# Patient Record
Sex: FEMALE | Race: WHITE | NOT HISPANIC OR LATINO | Employment: FULL TIME | URBAN - METROPOLITAN AREA
[De-identification: names, ages, dates, MRNs, and addresses within clinical notes are randomized per-mention and may not be internally consistent; named-entity substitution may affect disease eponyms.]

---

## 2023-03-28 DIAGNOSIS — K21.9 GASTROESOPHAGEAL REFLUX DISEASE WITHOUT ESOPHAGITIS: ICD-10-CM

## 2023-03-28 RX ORDER — OMEPRAZOLE 40 MG/1
CAPSULE, DELAYED RELEASE ORAL
Qty: 90 CAPSULE | Refills: 1 | Status: SHIPPED | OUTPATIENT
Start: 2023-03-28 | End: 2023-11-28 | Stop reason: SDUPTHER

## 2023-06-27 ENCOUNTER — TELEPHONE (OUTPATIENT)
Dept: PRIMARY CARE | Facility: CLINIC | Age: 63
End: 2023-06-27

## 2023-06-27 ENCOUNTER — OFFICE VISIT (OUTPATIENT)
Dept: PRIMARY CARE | Facility: CLINIC | Age: 63
End: 2023-06-27
Payer: COMMERCIAL

## 2023-06-27 VITALS
TEMPERATURE: 97.1 F | SYSTOLIC BLOOD PRESSURE: 122 MMHG | HEIGHT: 68 IN | BODY MASS INDEX: 28.64 KG/M2 | WEIGHT: 189 LBS | HEART RATE: 74 BPM | DIASTOLIC BLOOD PRESSURE: 79 MMHG

## 2023-06-27 DIAGNOSIS — K11.21 PAROTITIS, ACUTE: Primary | ICD-10-CM

## 2023-06-27 PROBLEM — K21.9 GERD (GASTROESOPHAGEAL REFLUX DISEASE): Status: ACTIVE | Noted: 2023-06-27

## 2023-06-27 PROBLEM — D72.819 LEUKOPENIA: Status: ACTIVE | Noted: 2023-06-27

## 2023-06-27 PROBLEM — R76.8 RHEUMATOID FACTOR POSITIVE: Status: ACTIVE | Noted: 2023-06-27

## 2023-06-27 PROBLEM — M85.80 OSTEOPENIA: Status: ACTIVE | Noted: 2023-06-27

## 2023-06-27 PROBLEM — E78.5 HLD (HYPERLIPIDEMIA): Status: ACTIVE | Noted: 2023-06-27

## 2023-06-27 PROBLEM — R51.9 HEADACHE: Status: ACTIVE | Noted: 2023-06-27

## 2023-06-27 PROCEDURE — 99213 OFFICE O/P EST LOW 20 MIN: CPT | Performed by: FAMILY MEDICINE

## 2023-06-27 PROCEDURE — 1036F TOBACCO NON-USER: CPT | Performed by: FAMILY MEDICINE

## 2023-06-27 RX ORDER — AMOXICILLIN AND CLAVULANATE POTASSIUM 875; 125 MG/1; MG/1
875 TABLET, FILM COATED ORAL 2 TIMES DAILY
Qty: 20 TABLET | Refills: 0 | Status: SHIPPED | OUTPATIENT
Start: 2023-06-27 | End: 2023-07-07

## 2023-06-27 RX ORDER — IBUPROFEN 600 MG/1
TABLET ORAL
COMMUNITY
Start: 2013-06-23

## 2023-06-27 RX ORDER — VIT C/E/ZN/COPPR/LUTEIN/ZEAXAN 250MG-90MG
25 CAPSULE ORAL DAILY
COMMUNITY

## 2023-06-27 ASSESSMENT — PATIENT HEALTH QUESTIONNAIRE - PHQ9
1. LITTLE INTEREST OR PLEASURE IN DOING THINGS: NOT AT ALL
SUM OF ALL RESPONSES TO PHQ9 QUESTIONS 1 AND 2: 0
2. FEELING DOWN, DEPRESSED OR HOPELESS: NOT AT ALL

## 2023-06-27 NOTE — PROGRESS NOTES
"Subjective   Patient ID: Maryam Mccarthy is a 63 y.o. female who presents for Facial Swelling (Right sided face swelling since this morning.  Warm to the touch.  Teeth feel fine, throat doesn't hurt, and her ears are fine. ).    HPI   64 yo female presents co right sided facial swelling since this morning  Feels warm  No fevers or chills  No dental pain  No congestion, sore throat, ear pain  No change in salivation  No regular meds/antihistamines  No hx of similar issue in past  No hx of parotitis         Review of Systems  ROS as stated in HPI    Objective   /79   Pulse 74   Temp 36.2 °C (97.1 °F)   Ht 1.727 m (5' 8\")   Wt 85.7 kg (189 lb)   BMI 28.74 kg/m²     Physical Exam  Constitutional: A&O; NAD  HEENT: conjunctiva normal. EOMI; no sores in mouth. No posterior pharyngeal erythema or edema; +right parotid swelling and tenderness with mild erythema and warmth; no rash  PERRLA; lids normal; TM's clear;   Neck: supple;   Heart: RRR     Lungs: CTA bilateral    Neuro: No gross neuro deficits    Psych: Judgment, orientation, mood, affect, insight wnl   Assessment/Plan   Problem List Items Addressed This Visit    None  Visit Diagnoses       Parotitis, acute    -  Primary    Relevant Medications    amoxicillin-pot clavulanate (Augmentin) 875-125 mg tablet          Warm compresses; suck on sour candy  Rx augmentin  Fu if persists or worsens.  Pt info handout on parotitis from up to date site given to pt         "

## 2023-10-23 ENCOUNTER — TELEPHONE (OUTPATIENT)
Dept: PRIMARY CARE | Facility: CLINIC | Age: 63
End: 2023-10-23
Payer: COMMERCIAL

## 2023-10-23 NOTE — TELEPHONE ENCOUNTER
Patient has an appointment scheduled for December 7th and she now learned she has to go to a meeting for work on that day.   She wants to get her physical in before the new year.  Is there a time that you can squeeze her in.

## 2023-11-28 ENCOUNTER — HOSPITAL ENCOUNTER (OUTPATIENT)
Dept: RADIOLOGY | Facility: EXTERNAL LOCATION | Age: 63
Discharge: HOME | End: 2023-11-28

## 2023-11-28 ENCOUNTER — OFFICE VISIT (OUTPATIENT)
Dept: PRIMARY CARE | Facility: CLINIC | Age: 63
End: 2023-11-28
Payer: COMMERCIAL

## 2023-11-28 VITALS
TEMPERATURE: 97.9 F | HEIGHT: 68 IN | DIASTOLIC BLOOD PRESSURE: 75 MMHG | WEIGHT: 175.2 LBS | HEART RATE: 80 BPM | SYSTOLIC BLOOD PRESSURE: 122 MMHG | BODY MASS INDEX: 26.55 KG/M2

## 2023-11-28 DIAGNOSIS — Z23 ENCOUNTER FOR IMMUNIZATION: ICD-10-CM

## 2023-11-28 DIAGNOSIS — K21.9 GASTROESOPHAGEAL REFLUX DISEASE WITHOUT ESOPHAGITIS: ICD-10-CM

## 2023-11-28 DIAGNOSIS — Z13.820 OSTEOPOROSIS SCREENING: ICD-10-CM

## 2023-11-28 DIAGNOSIS — Z78.0 POSTMENOPAUSAL: ICD-10-CM

## 2023-11-28 DIAGNOSIS — Z00.00 HEALTHCARE MAINTENANCE: Primary | ICD-10-CM

## 2023-11-28 LAB
NON-UH HIE A/G RATIO: 1.3
NON-UH HIE ALB: 4.1 G/DL (ref 3.4–5)
NON-UH HIE ALK PHOS: 68 UNIT/L (ref 45–117)
NON-UH HIE BILIRUBIN, TOTAL: 1.2 MG/DL (ref 0.3–1.2)
NON-UH HIE BUN/CREAT RATIO: 12.2
NON-UH HIE BUN: 11 MG/DL (ref 9–23)
NON-UH HIE CALCIUM: 9.8 MG/DL (ref 8.7–10.4)
NON-UH HIE CALCULATED LDL CHOLESTEROL: 143 MG/DL (ref 60–130)
NON-UH HIE CALCULATED OSMOLALITY: 282 MOSM/KG (ref 275–295)
NON-UH HIE CHLORIDE: 107 MMOL/L (ref 98–107)
NON-UH HIE CHOLESTEROL: 227 MG/DL (ref 100–200)
NON-UH HIE CO2, VENOUS: 28 MMOL/L (ref 20–31)
NON-UH HIE CREATININE: 0.9 MG/DL (ref 0.5–0.8)
NON-UH HIE FREE T4: 1.03 NG/DL (ref 0.89–1.76)
NON-UH HIE GFR AA: >60
NON-UH HIE GLOBULIN: 3.1 G/DL
NON-UH HIE GLOMERULAR FILTRATION RATE: >60 ML/MIN/1.73M?
NON-UH HIE GLUCOSE: 90 MG/DL (ref 74–106)
NON-UH HIE GOT: 22 UNIT/L (ref 15–37)
NON-UH HIE GPT: 10 UNIT/L (ref 10–49)
NON-UH HIE HCT: 38.4 % (ref 36–46)
NON-UH HIE HDL CHOLESTEROL: 56 MG/DL (ref 40–60)
NON-UH HIE HGB: 13.3 G/DL (ref 12–16)
NON-UH HIE INSTR WBC ND: 3.5
NON-UH HIE K: 4.6 MMOL/L (ref 3.5–5.1)
NON-UH HIE MCH: 30.6 PG (ref 27–34)
NON-UH HIE MCHC: 34.6 G/DL (ref 32–37)
NON-UH HIE MCV: 88.3 FL (ref 80–100)
NON-UH HIE MPV: 6.8 FL (ref 7.4–10.4)
NON-UH HIE NA: 142 MMOL/L (ref 135–145)
NON-UH HIE PLATELET: 249 X10 (ref 150–450)
NON-UH HIE RBC: 4.35 X10 (ref 4.2–5.4)
NON-UH HIE RDW: 12.8 % (ref 11.5–14.5)
NON-UH HIE TOTAL CHOL/HDL CHOL RATIO: 4.1
NON-UH HIE TOTAL PROTEIN: 7.2 G/DL (ref 5.7–8.2)
NON-UH HIE TRIGLYCERIDES: 140 MG/DL (ref 30–150)
NON-UH HIE TSH: 5.81 UIU/ML (ref 0.55–4.78)
NON-UH HIE VIT D 25: 34 NG/ML
NON-UH HIE WBC: 3.5 X10 (ref 4.5–11)

## 2023-11-28 PROCEDURE — 1036F TOBACCO NON-USER: CPT | Performed by: FAMILY MEDICINE

## 2023-11-28 PROCEDURE — 90686 IIV4 VACC NO PRSV 0.5 ML IM: CPT | Performed by: FAMILY MEDICINE

## 2023-11-28 PROCEDURE — 90471 IMMUNIZATION ADMIN: CPT | Performed by: FAMILY MEDICINE

## 2023-11-28 PROCEDURE — 99396 PREV VISIT EST AGE 40-64: CPT | Performed by: FAMILY MEDICINE

## 2023-11-28 RX ORDER — OMEPRAZOLE 40 MG/1
CAPSULE, DELAYED RELEASE ORAL
Qty: 90 CAPSULE | Refills: 1 | Status: SHIPPED | OUTPATIENT
Start: 2023-11-28 | End: 2024-05-28

## 2023-11-28 ASSESSMENT — PATIENT HEALTH QUESTIONNAIRE - PHQ9
1. LITTLE INTEREST OR PLEASURE IN DOING THINGS: NOT AT ALL
2. FEELING DOWN, DEPRESSED OR HOPELESS: NOT AT ALL
SUM OF ALL RESPONSES TO PHQ9 QUESTIONS 1 AND 2: 0

## 2023-11-28 NOTE — PROGRESS NOTES
"Subjective   Patient ID: Maryam Mccarthy is a 63 y.o. female who presents for Annual Exam (Blood work done this morning.  Mammo 8/23 at [facility].  Flu shot today. ).    HPI     63 -year-old female presents for physical     sees gyn for exams/mammos;   s/p complete hysterectomy/BSO; hx of uterine CA 2010; no chemo or radiation.       BMI 26 wt is down 15 lbs since last yr   has been watching diet and walking      12/2020 DEXA: normal prior showed osteopenia  takes Ca+vit D     colonoscopy 8/2017 [facility]. normal. f/u 5 yr   had CT 2021- showed colitis; saw GI for f/u;     tetanus 2017   Zostavax 2015; shingrix- had  Flu shot- will get today  covid shots- had;       sees optho; hx of left eye vitreos detachment   She has seen her dentist for regular cleanings      She denies any chest pains, palpitations, edema, shortness of breath, abdominal pains, nausea, vomiting, diarrhea, constipation, blood in stool, melena.   hx reflux- controlled on omeprazole;      saw derm once 2019 ; plans to see again for a skin check;       hx of leukopenia- saw heme dr sierra 2018 - neg workup for WBC. was found to have +IVETH and RF; referred to rheum- saw dr almazan 2/2019- asymptompatic.      +stress at work- works for [employer];          Review of Systems  ROS as stated in HPI    Objective   /75   Pulse 80   Temp 36.6 °C (97.9 °F)   Ht 1.727 m (5' 8\")   Wt 79.5 kg (175 lb 3.2 oz)   BMI 26.64 kg/m²     Physical Exam  Constitutional: A&O; NAD  HEENT: conjunctiva normal. EOMI; PERRLA; lids normal; TM's clear;   Neck: supple; No lymphadenopathy   Heart: RRR     Lungs: CTA bilateral   Abd: Soft, Nontender, Nondistended  Ext:  No edema;    Neuro: No gross neuro deficits     Psych: Judgment, orientation, mood, affect, insight wnl   Assessment/Plan   Problem List Items Addressed This Visit             ICD-10-CM    GERD (gastroesophageal reflux disease) K21.9    Relevant Medications    omeprazole (PriLOSEC) 40 mg DR capsule     Other " Visit Diagnoses         Codes    Healthcare maintenance    -  Primary Z00.00    Relevant Orders    Comprehensive Metabolic Panel    Lipid Panel    CBC    TSH with reflex to Free T4 if abnormal    Vitamin D 25-Hydroxy,Total (for eval of Vitamin D levels)    Osteoporosis screening     Z13.820    Relevant Orders    XR DEXA bone density (Completed)    Postmenopausal     Z78.0    Relevant Orders    XR DEXA bone density (Completed)    Encounter for immunization     Z23    Relevant Orders    Flu vaccine (IIV4) age 6 months and greater, preservative free (Completed)          sees gyn for exams/mammos.   8/2017 colonoscopy- f/u 5 yrs; is due- f/u with GI # given  Tdap 2017  flu shot GIVEN today  had shingrix   Defers RSV  12/2020 bone density test; recheck DEXA  Recommend Calcium + Vitamin D supplement.   healthy lifestyle-diet, exercise.   check labs   Fu with derm for skin check #s given  Wellness form signed

## 2023-11-30 ENCOUNTER — TELEPHONE (OUTPATIENT)
Dept: PRIMARY CARE | Facility: CLINIC | Age: 63
End: 2023-11-30
Payer: COMMERCIAL

## 2023-11-30 DIAGNOSIS — R79.89 ELEVATED SERUM CREATININE: ICD-10-CM

## 2023-11-30 DIAGNOSIS — R79.89 ELEVATED TSH: ICD-10-CM

## 2023-11-30 NOTE — TELEPHONE ENCOUNTER
----- Message from Angelina Vitale DO sent at 11/29/2023 11:21 AM EST -----  Please let pt know that vitamin D, sugar, electrolytes, and liver function are all normal. Her cholesterol was slightly elevated.    I recommend a healthy diet and exercise and we will cont to monitor.   Her white blood cell count was again slightly low but stable.   Her kidney function was slightly elevated at 0.9 (should be less than 0.8) and  her screening thyroid test was slightly out of range but the actual level of circulating thyroid hormone was normal.  We can recheck her kidney and thyroid in 6 wks.  Enter order for nonfasting labs  Bmp dx elevated creatinine  Tsh and t4free dx elevated TSH    ----- Message -----  From: Loulou Casanova  Sent: 11/29/2023  10:54 AM EST  To: Angelina Vitale DO

## 2024-01-26 LAB
NON-UH HIE BUN/CREAT RATIO: 17.8
NON-UH HIE BUN: 16 MG/DL (ref 9–23)
NON-UH HIE CALCIUM: 9.6 MG/DL (ref 8.7–10.4)
NON-UH HIE CALCULATED OSMOLALITY: 275 MOSM/KG (ref 275–295)
NON-UH HIE CHLORIDE: 102 MMOL/L (ref 98–107)
NON-UH HIE CO2, VENOUS: 29 MMOL/L (ref 20–31)
NON-UH HIE CREATININE: 0.9 MG/DL (ref 0.5–0.8)
NON-UH HIE FREE T4: 1.04 NG/DL (ref 0.89–1.76)
NON-UH HIE GFR AA: >60
NON-UH HIE GLOMERULAR FILTRATION RATE: >60 ML/MIN/1.73M?
NON-UH HIE GLUCOSE: 88 MG/DL (ref 74–106)
NON-UH HIE K: 3.9 MMOL/L (ref 3.5–5.1)
NON-UH HIE NA: 137 MMOL/L (ref 135–145)
NON-UH HIE TSH: 3.28 UIU/ML (ref 0.55–4.78)

## 2024-01-31 ENCOUNTER — TELEPHONE (OUTPATIENT)
Dept: PRIMARY CARE | Facility: CLINIC | Age: 64
End: 2024-01-31
Payer: COMMERCIAL

## 2024-01-31 NOTE — TELEPHONE ENCOUNTER
----- Message from Angelina Vitale, DO sent at 1/31/2024 10:31 AM EST -----  Let patient know her repeat thyroid levels are normal.  Her repeat kidney function was again just slightly elevated at 0.9 (should be less than 0.8).  We will continue to monitor.

## 2024-05-02 ENCOUNTER — TELEPHONE (OUTPATIENT)
Dept: PRIMARY CARE | Facility: CLINIC | Age: 64
End: 2024-05-02
Payer: COMMERCIAL

## 2024-05-02 NOTE — TELEPHONE ENCOUNTER
Pt is asking for the colonoscopy and bone density scan to be put in the system. Maryam #[phone]

## 2024-05-09 DIAGNOSIS — Z12.11 COLON CANCER SCREENING: ICD-10-CM

## 2024-05-09 DIAGNOSIS — Z78.0 POSTMENOPAUSAL: ICD-10-CM

## 2024-05-09 DIAGNOSIS — Z13.820 OSTEOPOROSIS SCREENING: ICD-10-CM

## 2024-05-13 ENCOUNTER — HOSPITAL ENCOUNTER (OUTPATIENT)
Dept: RADIOLOGY | Facility: CLINIC | Age: 64
Discharge: HOME | End: 2024-05-13
Payer: COMMERCIAL

## 2024-05-13 ENCOUNTER — OFFICE VISIT (OUTPATIENT)
Dept: URGENT CARE | Facility: CLINIC | Age: 64
End: 2024-05-13
Payer: COMMERCIAL

## 2024-05-13 VITALS
DIASTOLIC BLOOD PRESSURE: 64 MMHG | HEART RATE: 80 BPM | TEMPERATURE: 97.5 F | WEIGHT: 168 LBS | HEIGHT: 69 IN | SYSTOLIC BLOOD PRESSURE: 107 MMHG | OXYGEN SATURATION: 96 % | BODY MASS INDEX: 24.88 KG/M2 | RESPIRATION RATE: 16 BRPM

## 2024-05-13 DIAGNOSIS — S99.912A INJURY OF LEFT ANKLE, INITIAL ENCOUNTER: ICD-10-CM

## 2024-05-13 DIAGNOSIS — S99.912A INJURY OF LEFT ANKLE, INITIAL ENCOUNTER: Primary | ICD-10-CM

## 2024-05-13 DIAGNOSIS — S93.492A SPRAIN OF ANTERIOR TALOFIBULAR LIGAMENT OF LEFT ANKLE, INITIAL ENCOUNTER: ICD-10-CM

## 2024-05-13 PROCEDURE — 73630 X-RAY EXAM OF FOOT: CPT | Mod: LEFT SIDE | Performed by: RADIOLOGY

## 2024-05-13 PROCEDURE — 73564 X-RAY EXAM KNEE 4 OR MORE: CPT | Mod: LT

## 2024-05-13 PROCEDURE — A6449 LT COMPRES BAND >=3" <5"/YD: HCPCS | Performed by: PHYSICIAN ASSISTANT

## 2024-05-13 PROCEDURE — 99203 OFFICE O/P NEW LOW 30 MIN: CPT | Performed by: PHYSICIAN ASSISTANT

## 2024-05-13 PROCEDURE — 73630 X-RAY EXAM OF FOOT: CPT | Mod: LT

## 2024-05-13 PROCEDURE — 73610 X-RAY EXAM OF ANKLE: CPT | Mod: LT

## 2024-05-13 PROCEDURE — 73564 X-RAY EXAM KNEE 4 OR MORE: CPT | Mod: LEFT SIDE | Performed by: RADIOLOGY

## 2024-05-13 PROCEDURE — 73610 X-RAY EXAM OF ANKLE: CPT | Mod: LEFT SIDE | Performed by: RADIOLOGY

## 2024-05-13 ASSESSMENT — PAIN SCALES - GENERAL: PAINLEVEL: 8

## 2024-05-13 NOTE — PATIENT INSTRUCTIONS
Assessment/Plan   Problem List Items Addressed This Visit       Injury of left ankle - Primary    Relevant Orders    XR foot left 3+ views    XR ankle left 3+ views    Sprain of anterior talofibular ligament of left ankle         Rest, ice the area. Recommend icing area 4 times daily. Do not ice for more than 20 minutes at a time. Wait at least 30min between icings.     Compress the affected area using Ace wrap and elevate to help decrease swelling.     Take ibuprofen as needed for pain and inflammation. If you cannot tolerate NSAIDS due to health conditions, pregnancy, or allergy, use tylenol instead of ibuprofen.     Follow-up with an PCP or Ortho if symptoms persist despite rest and treatment.

## 2024-05-13 NOTE — PROGRESS NOTES
"Subjective   Patient ID: Maryam Mccarthy is a 64 y.o. female who presents for Ankle Injury (Fell off deck @ 2:30 ).  Patient sustained injury to her left foot ankle and knee after suffering a fall off of her deck while trying to step off and it this afternoon.  She notes that she inverted the left foot and fell onto the left knee.  She reports a history of osteopenia and previous fracture to the left ankle.  She denies any numbness or tingling.  No further injuries from the fall.  No head trauma or loss of consciousness no use of blood thinners.    Past Medical History:   Diagnosis Date   • Asymptomatic menopausal state     Postmenopausal   • Encounter for screening for osteoporosis     Screening for osteoporosis   • Fracture of coccyx, initial encounter for closed fracture (Multi)     Fracture of coccyx   • Lactose intolerance, unspecified 12/12/2014    Lactose intolerance   • Melena 07/06/2021    Blood in stool   • Pain in right knee 07/07/2021    Right knee pain   • Pain in right knee 11/29/2021    Right knee pain   • Personal history of malignant neoplasm of other parts of uterus 12/12/2014    History of cancer of uterus   • Personal history of other diseases of the digestive system 06/14/2021    History of constipation   • Personal history of other specified conditions 03/24/2022    History of headache   • Personal history of peptic ulcer disease 02/11/2015    History of gastric ulcer   • Umbilical hernia without obstruction or gangrene 11/03/2016    Umbilical hernia without obstruction and without gangrene         The remainder of the systems were reviewed and are negative unless noted above    Objective   /64 (BP Location: Left arm, Patient Position: Sitting, BP Cuff Size: Adult)   Pulse 80   Temp 36.4 °C (97.5 °F) (Temporal)   Resp 16   Ht 1.753 m (5' 9\")   Wt 76.2 kg (168 lb)   SpO2 96%   BMI 24.81 kg/m²   Physical Exam  Constitutional:       General: She is not in acute distress.     " Appearance: Normal appearance. She is not ill-appearing, toxic-appearing or diaphoretic.   HENT:      Head: Normocephalic and atraumatic.      Mouth/Throat:      Mouth: Mucous membranes are moist.      Pharynx: Oropharynx is clear.   Eyes:      Conjunctiva/sclera: Conjunctivae normal.   Cardiovascular:      Rate and Rhythm: Normal rate and regular rhythm.      Heart sounds: No murmur heard.  Pulmonary:      Effort: Pulmonary effort is normal. No respiratory distress.      Breath sounds: Normal breath sounds. No wheezing.   Musculoskeletal:         General: Swelling, tenderness and signs of injury present. No deformity. Normal range of motion.      Cervical back: Normal range of motion and neck supple.      Comments: Tenderness most appreciable over the left lateral ankle with some diffuse tenderness into the midfoot.  No significant tenderness at the base of the fifth metatarsal.  Patient with some diffuse tenderness of the left knee without any significant joint effusion.  There are no breaks in the skin.  Pedal pulses are 2+   Skin:     General: Skin is warm and dry.      Findings: No erythema or rash.   Neurological:      General: No focal deficit present.      Mental Status: She is alert and oriented to person, place, and time.      Gait: Gait normal.      Comments: Sensation intact distally into the left foot       Assessment/Plan   Problem List Items Addressed This Visit       Injury of left ankle - Primary    Relevant Orders    XR foot left 3+ views (Completed)    XR ankle left 3+ views    Sprain of anterior talofibular ligament of left ankle         Rest, ice the area. Recommend icing area 4 times daily. Do not ice for more than 20 minutes at a time. Wait at least 30min between icings.     Compress the affected area using Ace wrap and elevate to help decrease swelling.     Take ibuprofen as needed for pain and inflammation. If you cannot tolerate NSAIDS due to health conditions, pregnancy, or allergy, use  tylenol instead of ibuprofen.     Follow-up with an PCP or Ortho if symptoms persist despite rest and treatment.

## 2024-05-21 ENCOUNTER — TELEPHONE (OUTPATIENT)
Dept: PRIMARY CARE | Facility: CLINIC | Age: 64
End: 2024-05-21
Payer: COMMERCIAL

## 2024-05-21 NOTE — TELEPHONE ENCOUNTER
----- Message from Angelina Vitale DO sent at 5/20/2024  8:49 PM EDT -----  Please let pt know her bone density test shows osteopenia, or some thinning of the bones. I recommend regular walking/weight bearing exercise and taking a daily calcium and Vit D supplement and we will cont to monitor

## 2024-05-25 DIAGNOSIS — K21.9 GASTROESOPHAGEAL REFLUX DISEASE WITHOUT ESOPHAGITIS: ICD-10-CM

## 2024-05-28 RX ORDER — OMEPRAZOLE 40 MG/1
CAPSULE, DELAYED RELEASE ORAL
Qty: 90 CAPSULE | Refills: 1 | Status: SHIPPED | OUTPATIENT
Start: 2024-05-28

## 2024-08-21 ENCOUNTER — TELEMEDICINE (OUTPATIENT)
Dept: PRIMARY CARE | Facility: CLINIC | Age: 64
End: 2024-08-21
Payer: COMMERCIAL

## 2024-08-21 DIAGNOSIS — U07.1 COVID-19: Primary | ICD-10-CM

## 2024-08-21 DIAGNOSIS — R11.0 NAUSEA: ICD-10-CM

## 2024-08-21 PROBLEM — S99.912A INJURY OF LEFT ANKLE: Status: RESOLVED | Noted: 2024-05-13 | Resolved: 2024-08-21

## 2024-08-21 PROCEDURE — 99213 OFFICE O/P EST LOW 20 MIN: CPT | Performed by: NURSE PRACTITIONER

## 2024-08-21 RX ORDER — ONDANSETRON 4 MG/1
4 TABLET, FILM COATED ORAL EVERY 8 HOURS PRN
Qty: 30 TABLET | Refills: 0 | Status: SHIPPED | OUTPATIENT
Start: 2024-08-21 | End: 2024-08-31

## 2024-08-21 ASSESSMENT — ENCOUNTER SYMPTOMS
PALPITATIONS: 0
SHORTNESS OF BREATH: 1
EYE DISCHARGE: 0
CHILLS: 1
EYE REDNESS: 0
MYALGIAS: 1
WHEEZING: 0
COUGH: 0
HEADACHES: 1
SINUS PRESSURE: 0
FACIAL SWELLING: 0
ARTHRALGIAS: 1
DIARRHEA: 1
FEVER: 0
VOMITING: 0
CHEST TIGHTNESS: 0
ABDOMINAL PAIN: 0
RHINORRHEA: 1
NAUSEA: 1
SORE THROAT: 0
FATIGUE: 1

## 2024-08-21 ASSESSMENT — PATIENT HEALTH QUESTIONNAIRE - PHQ9
2. FEELING DOWN, DEPRESSED OR HOPELESS: MORE THAN HALF THE DAYS
1. LITTLE INTEREST OR PLEASURE IN DOING THINGS: NOT AT ALL
SUM OF ALL RESPONSES TO PHQ9 QUESTIONS 1 AND 2: 2

## 2024-08-21 NOTE — PATIENT INSTRUCTIONS
Tylenol 500mg 2 every 8hrs or 325mg 3 every 8hrs  Ibuprofen 200mg 3 every 6hrs or 4 every 8hrs  sudafed  Zyrtec  zofran  Paxlovid  Fluids  Rest  Call if sx worsen or change or not starting to get better in 2-3d      I will communicate with you via [employer] regarding messages and results. If you need help with this, you can call the support line at [phone].    IT WAS A PLEASURE TO SEE YOU TODAY. THANK YOU FOR CHOOSING US FOR YOUR HEALTHCARE NEEDS.

## 2024-08-21 NOTE — PROGRESS NOTES
Subjective   Patient ID: Maryam Mccarthy is a 64 y.o. female who presents for Cough.  Last physical: has next cpe scheduled    current sx- headache, sick to stomach, diarrhea, body aches, fatigue.   when did sx start- this morning.   When was her covid test positive? Today     Sister just passed, wake was today and can not attend.     Patient was seen today via Telehealth by agreement and consent in light of the current audio and video capabilities or audio capability only. I used the following Telehealth technology:   Audio and video capabilities through [employer].  Total length of call:  10 minutes face to face with the patient and more than 50 % of this time was spent in counseling and coordination of care.    This patient encounter is appropriate and reasonable under the circumstances given the patient's particular presentation at this time. The patient has been advised of the potential risks and limitations of this mode of treatment (including but not limited to the absence of in-person examination) and has agreed to be treated in a remote fashion in spite of them. No vitals or physical exam was performed. Any and all of the patient's/patient's family's questions on this issue have been answered and I have made no promises or guarantees to the patient. If I felt the patient should be evaluated in person, then I would have directed the patient to make an in office appointment. The patient has also been advised to contact this office for worsening conditions or problems, and seek emergency medical treatment and/or call 911 if the patient deems either necessary. The patient stated that they are currently in the state [facility]. If the patient is a minor, permission has been obtained by the parent or guardian for the patient to receive medical care at this visit.    HPI  headache, nausea, diarrhea, body aches, fatigue, runny nose, chills, weakness since this am  Sob the last few days  Pos covid today    Sis  of  cancer suddenly    no cough,  wheezing, tight upper chest, fever,  ST, new loss of taste or smell,  vomiting,  red eye, rash, bruising, cyanosis, ear pain, ear pressure,  stuffy nose, post nasal drip, pain with deep breath, leg or foot swelling, calf pain  loss of appetite-yes  fluids-yes  has urinated at least 3 times in 24hrs  Seasonal allergies-none  Selftxt-tylenol    No known exposure to COVID-19  No known exposure to strep  No known exposure to influenza  No one sick around the pt      Risk factors:  Chronic disease/comorbidities: none  not a healthcare worker  Age: not 65yrs of age and older      No care team member to display     Review of Systems   Constitutional:  Positive for chills and fatigue. Negative for fever.   HENT:  Positive for rhinorrhea. Negative for congestion, ear discharge, ear pain, facial swelling, postnasal drip, sinus pressure and sore throat.    Eyes:  Negative for discharge and redness.   Respiratory:  Positive for shortness of breath. Negative for cough, chest tightness and wheezing.    Cardiovascular:  Negative for chest pain, palpitations and leg swelling.   Gastrointestinal:  Positive for diarrhea and nausea. Negative for abdominal pain and vomiting.   Musculoskeletal:  Positive for arthralgias and myalgias.   Skin:  Negative for rash.   Neurological:  Positive for headaches.       Objective   There were no vitals taken for this visit.   BP Readings from Last 3 Encounters:   05/13/24 107/64   11/28/23 122/75   06/27/23 122/79     Wt Readings from Last 3 Encounters:   05/13/24 76.2 kg (168 lb)   11/28/23 79.5 kg (175 lb 3.2 oz)   06/27/23 85.7 kg (189 lb)       Physical Exam  Constitutional:       General: She is not in acute distress.     Appearance: Normal appearance. She is ill-appearing. She is not toxic-appearing.      Comments: No audible wheezing, able to speak in full sentences   Neurological:      Mental Status: She is alert.         Assessment/Plan   Diagnoses and all  orders for this visit:  COVID-19  -     nirmatrelvir-ritonavir (PAXLOVID) 300 mg (150 mg x 2)-100 mg tablet therapy pack; Take 3 tablets by mouth 2 times a day for 5 days. Follow the instructions on the package  Nausea  -     ondansetron (Zofran) 4 mg tablet; Take 1 tablet (4 mg) by mouth every 8 hours if needed for nausea or vomiting for up to 10 days.

## 2024-08-26 ENCOUNTER — PATIENT MESSAGE (OUTPATIENT)
Dept: PRIMARY CARE | Facility: CLINIC | Age: 64
End: 2024-08-26
Payer: COMMERCIAL

## 2024-08-26 DIAGNOSIS — R06.02 SOB (SHORTNESS OF BREATH): Primary | ICD-10-CM

## 2024-08-30 RX ORDER — PREDNISONE 20 MG/1
TABLET ORAL
Qty: 20 TABLET | Refills: 0 | Status: SHIPPED | OUTPATIENT
Start: 2024-08-30

## 2024-09-04 DIAGNOSIS — L98.9 CHANGING SKIN LESION: ICD-10-CM

## 2024-09-05 ENCOUNTER — OFFICE VISIT (OUTPATIENT)
Dept: DERMATOLOGY | Facility: CLINIC | Age: 64
End: 2024-09-05
Payer: COMMERCIAL

## 2024-09-05 DIAGNOSIS — L30.9 ECZEMA, UNSPECIFIED TYPE: Primary | ICD-10-CM

## 2024-09-05 PROCEDURE — 99204 OFFICE O/P NEW MOD 45 MIN: CPT | Performed by: NURSE PRACTITIONER

## 2024-09-05 NOTE — PROGRESS NOTES
Subjective     Maryam Mccarthy is a 64 y.o. female who presents for the following: Rash.   New patient in for red raised bumps to bilateral arms and chest for months. Patient states she thinks it is sun allergy but has become worse over the months. Patient has had no treatment to the areas.     Review of Systems:  No other skin or systemic complaints other than what is documented elsewhere in the note.    The following portions of the chart were reviewed this encounter and updated as appropriate:       Skin Cancer History  No skin cancer on file.    Specialty Problems    None    Past Medical History:  Maryam Mccarthy  has a past medical history of Asymptomatic menopausal state, Encounter for screening for osteoporosis, Fracture of coccyx, initial encounter for closed fracture (Multi), Lactose intolerance, unspecified (12/12/2014), Melena (07/06/2021), Pain in right knee (07/07/2021), Pain in right knee (11/29/2021), Personal history of malignant neoplasm of other parts of uterus (12/12/2014), Personal history of other diseases of the digestive system (06/14/2021), Personal history of other specified conditions (03/24/2022), Personal history of peptic ulcer disease (02/11/2015), and Umbilical hernia without obstruction or gangrene (11/03/2016).    Past Surgical History:  Maryam Mccarthy  has a past surgical history that includes Tubal ligation (12/14/2014); Total abdominal hysterectomy w/ bilateral salpingoophorectomy (12/14/2014); Other surgical history (11/02/2016); Umbilical hernia repair (11/02/2016); Other surgical history (11/25/2020); Other surgical history (02/14/2019); Other surgical history (02/14/2019); and Other surgical history (12/01/2022).    Family History:  Patient family history is not on file.    Social History:  Maryam Mccarthy  reports that she has never smoked. She has never used smokeless tobacco. She reports current alcohol use. She reports that she does not use drugs.    Allergies:  Codeine  and Oxycodone-acetaminophen    Current Medications / CAM's:    Current Outpatient Medications:     cholecalciferol (Vitamin D-3) 25 MCG (1000 UT) capsule, Take 1 capsule (25 mcg) by mouth once daily., Disp: , Rfl:     ibuprofen 600 mg tablet, Take by mouth., Disp: , Rfl:     omeprazole (PriLOSEC) 40 mg DR capsule, TAKE 1 CAPSULE BY MOUTH DAILY BEFORE A MEAL, Disp: 90 capsule, Rfl: 1    predniSONE (Deltasone) 20 mg tablet, 3 tabs daily x3d then 2 tabs daily x 3d then 1 tab daily x 3d then 1/2 tab daily x 3d with food, Disp: 20 tablet, Rfl: 0    triamcinolone (Kenalog) 0.1 % cream, Apply topically 2 times a day as needed for rash., Disp: 80 g, Rfl: 1     Objective   Well appearing patient in no apparent distress; mood and affect are within normal limits.      Assessment/Plan   1. Eczema, unspecified type  Left Forearm - Anterior, Right Forearm - Anterior  Eczematous plaques and post inflammatory hyperpigmentation to bilateral upper extremities present since early summer.      PLAN:  Triamcinolone 0.1% cream twice daily     Related Medications  triamcinolone (Kenalog) 0.1 % cream  Apply topically 2 times a day as needed for rash.

## 2024-09-06 RX ORDER — TRIAMCINOLONE ACETONIDE 1 MG/G
CREAM TOPICAL 2 TIMES DAILY PRN
Qty: 80 G | Refills: 1 | Status: SHIPPED | OUTPATIENT
Start: 2024-09-06

## 2024-10-16 ENCOUNTER — APPOINTMENT (OUTPATIENT)
Dept: DERMATOLOGY | Facility: CLINIC | Age: 64
End: 2024-10-16
Payer: COMMERCIAL

## 2024-10-16 DIAGNOSIS — L30.9 ECZEMA, UNSPECIFIED TYPE: Primary | ICD-10-CM

## 2024-10-16 DIAGNOSIS — Z12.83 SKIN CANCER SCREENING: ICD-10-CM

## 2024-10-16 PROCEDURE — 99213 OFFICE O/P EST LOW 20 MIN: CPT | Performed by: NURSE PRACTITIONER

## 2024-10-16 NOTE — PROGRESS NOTES
Subjective     Maryam Mccarthy is a 64 y.o. female who presents for the following: Eczema.   Established patient in for virtual follow up last seen 09/05/2024  and prescribed to use Triamcinolone 0.1% cream twice daily.    Review of Systems:  No other skin or systemic complaints other than what is documented elsewhere in the note.    The following portions of the chart were reviewed this encounter and updated as appropriate:       Skin Cancer History  No skin cancer on file.    Specialty Problems    None    Past Medical History:  Maryam Mccarthy  has a past medical history of Asymptomatic menopausal state, Encounter for screening for osteoporosis, Fracture of coccyx, initial encounter for closed fracture (Multi), Lactose intolerance, unspecified (12/12/2014), Melena (07/06/2021), Pain in right knee (07/07/2021), Pain in right knee (11/29/2021), Personal history of malignant neoplasm of other parts of uterus (12/12/2014), Personal history of other diseases of the digestive system (06/14/2021), Personal history of other specified conditions (03/24/2022), Personal history of peptic ulcer disease (02/11/2015), and Umbilical hernia without obstruction or gangrene (11/03/2016).    Past Surgical History:  Maryam Mccarthy  has a past surgical history that includes Tubal ligation (12/14/2014); Total abdominal hysterectomy w/ bilateral salpingoophorectomy (12/14/2014); Other surgical history (11/02/2016); Umbilical hernia repair (11/02/2016); Other surgical history (11/25/2020); Other surgical history (02/14/2019); Other surgical history (02/14/2019); and Other surgical history (12/01/2022).    Family History:  Patient family history is not on file.    Social History:  Maryam Mccarthy  reports that she has never smoked. She has never used smokeless tobacco. She reports current alcohol use. She reports that she does not use drugs.    Allergies:  Codeine and Oxycodone-acetaminophen    Current Medications / CAM's:    Current  Outpatient Medications:     cholecalciferol (Vitamin D-3) 25 MCG (1000 UT) capsule, Take 1 capsule (25 mcg) by mouth once daily., Disp: , Rfl:     ibuprofen 600 mg tablet, Take by mouth., Disp: , Rfl:     omeprazole (PriLOSEC) 40 mg DR capsule, TAKE 1 CAPSULE BY MOUTH DAILY BEFORE A MEAL, Disp: 90 capsule, Rfl: 1    predniSONE (Deltasone) 20 mg tablet, 3 tabs daily x3d then 2 tabs daily x 3d then 1 tab daily x 3d then 1/2 tab daily x 3d with food, Disp: 20 tablet, Rfl: 0     Objective   Well appearing patient in no apparent distress; mood and affect are within normal limits.      Assessment/Plan   1. Eczema, unspecified type  Left Forearm - Anterior, Right Forearm - Anterior  Clear on exam, patient states that she does have any dryness, redness, or irritation.  She self discontinued triamcinolone once it cleared.     Plan: Counseling.  I counseled the patient regarding the following:  Skin care: Patient should bathe using lukewarm water with a mild cleanser and moisturize immediately after. Emollients should be applied at least  2-3 times daily. Avoid scented detergents or fabric softeners. Keep fingernai ls short. Avoid excessive hand washing.  Expectations : The patient is aware that eczema is chronic in nature and can improve with moisturizers and topical steroids and worsen with stress,  scented soaps, detergents, scratching, dry skin, changes in weather and skin infections.  Contact office if:  Eczema worsens or fails to improve despite several weeks of treatment; patient develops skin infections (such as:yellow honey  colored crusts or cold sores).        2. Skin cancer screening    Related Procedures  Follow Up In Dermatology - Established Patient

## 2024-11-21 DIAGNOSIS — K21.9 GASTROESOPHAGEAL REFLUX DISEASE WITHOUT ESOPHAGITIS: ICD-10-CM

## 2024-11-21 RX ORDER — OMEPRAZOLE 40 MG/1
CAPSULE, DELAYED RELEASE ORAL
Qty: 90 CAPSULE | Refills: 1 | Status: SHIPPED | OUTPATIENT
Start: 2024-11-21

## 2024-11-25 ENCOUNTER — APPOINTMENT (OUTPATIENT)
Dept: PRIMARY CARE | Facility: CLINIC | Age: 64
End: 2024-11-25
Payer: COMMERCIAL

## 2024-11-25 VITALS
WEIGHT: 180 LBS | HEART RATE: 74 BPM | BODY MASS INDEX: 26.66 KG/M2 | HEIGHT: 69 IN | SYSTOLIC BLOOD PRESSURE: 112 MMHG | DIASTOLIC BLOOD PRESSURE: 75 MMHG | TEMPERATURE: 97.9 F

## 2024-11-25 DIAGNOSIS — Z23 ENCOUNTER FOR IMMUNIZATION: ICD-10-CM

## 2024-11-25 DIAGNOSIS — Z00.00 HEALTHCARE MAINTENANCE: Primary | ICD-10-CM

## 2024-11-25 LAB
NON-UH HIE A/G RATIO: 1.4
NON-UH HIE ALB: 4.2 G/DL (ref 3.4–5)
NON-UH HIE ALK PHOS: 60 UNIT/L (ref 45–117)
NON-UH HIE BASO COUNT: 0.05 X1000 (ref 0–0.2)
NON-UH HIE BASOS %: 1.3 %
NON-UH HIE BILIRUBIN, TOTAL: 1.3 MG/DL (ref 0.3–1.2)
NON-UH HIE BUN/CREAT RATIO: 17
NON-UH HIE BUN: 17 MG/DL (ref 9–23)
NON-UH HIE CALCIUM: 9.6 MG/DL (ref 8.7–10.4)
NON-UH HIE CALCULATED LDL CHOLESTEROL: 153 MG/DL (ref 60–130)
NON-UH HIE CALCULATED OSMOLALITY: 282 MOSM/KG (ref 275–295)
NON-UH HIE CHLORIDE: 107 MMOL/L (ref 98–107)
NON-UH HIE CHOLESTEROL: 231 MG/DL (ref 100–200)
NON-UH HIE CO2, VENOUS: 26 MMOL/L (ref 20–31)
NON-UH HIE CREATININE: 1 MG/DL (ref 0.5–0.8)
NON-UH HIE DIFF?: NO
NON-UH HIE EOS COUNT: 0.15 X1000 (ref 0–0.5)
NON-UH HIE EOSIN %: 4.2 %
NON-UH HIE GFR AA: >60
NON-UH HIE GLOBULIN: 2.9 G/DL
NON-UH HIE GLOMERULAR FILTRATION RATE: 56 ML/MIN/1.73M?
NON-UH HIE GLUCOSE: 77 MG/DL (ref 74–106)
NON-UH HIE GOT: 22 UNIT/L (ref 15–37)
NON-UH HIE GPT: 11 UNIT/L (ref 10–49)
NON-UH HIE HCT: 36.8 % (ref 36–46)
NON-UH HIE HDL CHOLESTEROL: 53 MG/DL (ref 40–60)
NON-UH HIE HGB A1C: 5 %
NON-UH HIE HGB: 12.8 G/DL (ref 12–16)
NON-UH HIE INSTR WBC: 3.4
NON-UH HIE K: 4 MMOL/L (ref 3.5–5.1)
NON-UH HIE LYMPH %: 28.4 %
NON-UH HIE LYMPH COUNT: 0.98 X1000 (ref 1.2–4.8)
NON-UH HIE MCH: 30.9 PG (ref 27–34)
NON-UH HIE MCHC: 34.7 G/DL (ref 32–37)
NON-UH HIE MCV: 88.9 FL (ref 80–100)
NON-UH HIE MONO %: 9.5 %
NON-UH HIE MONO COUNT: 0.33 X1000 (ref 0.1–1)
NON-UH HIE MPV: 6.8 FL (ref 7.4–10.4)
NON-UH HIE NA: 141 MMOL/L (ref 135–145)
NON-UH HIE NEUTROPHIL %: 56.5 %
NON-UH HIE NEUTROPHIL COUNT (ANC): 1.94 X1000 (ref 1.4–8.8)
NON-UH HIE NUCLEATED RBC: 0 /100WBC
NON-UH HIE PLATELET: 236 X10 (ref 150–450)
NON-UH HIE RBC: 4.14 X10 (ref 4.2–5.4)
NON-UH HIE RDW: 12.6 % (ref 11.5–14.5)
NON-UH HIE TOTAL CHOL/HDL CHOL RATIO: 4.4
NON-UH HIE TOTAL PROTEIN: 7.1 G/DL (ref 5.7–8.2)
NON-UH HIE TRIGLYCERIDES: 127 MG/DL (ref 30–150)
NON-UH HIE TSH: 4.4 UIU/ML (ref 0.55–4.78)
NON-UH HIE VIT D 25: 37 NG/ML
NON-UH HIE WBC: 3.4 X10 (ref 4.5–11)

## 2024-11-25 PROCEDURE — 90471 IMMUNIZATION ADMIN: CPT | Performed by: FAMILY MEDICINE

## 2024-11-25 PROCEDURE — 99396 PREV VISIT EST AGE 40-64: CPT | Performed by: FAMILY MEDICINE

## 2024-11-25 PROCEDURE — 1036F TOBACCO NON-USER: CPT | Performed by: FAMILY MEDICINE

## 2024-11-25 PROCEDURE — 90656 IIV3 VACC NO PRSV 0.5 ML IM: CPT | Performed by: FAMILY MEDICINE

## 2024-11-25 PROCEDURE — 3008F BODY MASS INDEX DOCD: CPT | Performed by: FAMILY MEDICINE

## 2024-11-25 ASSESSMENT — PATIENT HEALTH QUESTIONNAIRE - PHQ9
2. FEELING DOWN, DEPRESSED OR HOPELESS: NOT AT ALL
1. LITTLE INTEREST OR PLEASURE IN DOING THINGS: NOT AT ALL
SUM OF ALL RESPONSES TO PHQ9 QUESTIONS 1 AND 2: 0

## 2024-11-25 NOTE — PROGRESS NOTES
"Subjective   Patient ID: Maryam Mccarthy is a 64 y.o. female who presents for Annual Exam (She is fasting for blood work.  Flu shot today. ).    HPI   64 -year-old female presents for physical     sees gyn for exams/mammos;   s/p complete hysterectomy/BSO; hx of uterine CA ; no chemo or radiation.       BMI 26  has been watching diet and walking    hx mild HLD    2024 bone density test +osteopenia  takes Ca+vit D     10/2024 colonoscopy  normal. f/u 5 yrs  GI vollweiler. .     tetanus 2017   Zostavax ; shingrix- had  Flu shot- will get today  RSV- defers  covid shots- had;       sees optho; hx of left eye vitreos detachment   Dry eye- using drops  She has seen her dentist for regular cleanings      She denies any chest pains, palpitations, edema, shortness of breath, abdominal pains, nausea, vomiting, diarrhea, constipation, blood in stool, melena.   hx reflux- controlled on omeprazole;      saw derm; plans to see again for a skin check;       hx of leukopenia- saw heme dr sierra  - neg workup for WBC. was found to have +IVETH and RF; referred to rheum- saw dr almazan 2019- asymptompatic.       works for [employer]- planning to retire next yr;    +stress- her sister was diagnosed with metastatic lung cancer in  in  in August.  Has been doing grief counseling and is supportive family.      Review of Systems  ROS as stated in HPI    Objective   /75   Pulse 74   Temp 36.6 °C (97.9 °F)   Ht 1.753 m (5' 9\")   Wt 81.6 kg (180 lb)   BMI 26.58 kg/m²     Physical Exam    Constitutional: A&O; NAD  HEENT: conjunctiva normal. EOMI; PERRLA; lids normal; TM's clear;   Neck: supple; No lymphadenopathy   Heart: RRR     Lungs: CTA bilateral   Abd: Soft, Nontender, Nondistended  Ext:  No edema;    Neuro: No gross neuro deficits     Psych: Judgment, orientation, mood, affect, insight wnl     Assessment/Plan   Problem List Items Addressed This Visit    None  Visit Diagnoses         Codes    Healthcare " maintenance    -  Primary Z00.00    Relevant Orders    Comprehensive Metabolic Panel    Hemoglobin A1C    Lipid Panel    TSH with reflex to Free T4 if abnormal    Vitamin D 25-Hydroxy,Total (for eval of Vitamin D levels)    CBC and Auto Differential    Encounter for immunization     Z23    Relevant Orders    Flu vaccine, trivalent, preservative free, age 6 months and greater (Fluarix/Fluzone/Flulaval) (Completed)          sees gyn for exams/mammos.   10/2024 colonoscopy- f/u 5yrs   Tdap 2017  flu shot GIVEN today  had shingrix   Defers RSV  Prevnar 20 recommended at 64yo  5/2024 bone density test +osteopenia  Recommend Calcium + Vitamin D supplement.   healthy lifestyle-diet, exercise.   Scheduled for skin check with derm  Wellness form signed  Fu 1 yr or sooner if needed

## 2024-11-26 ENCOUNTER — TELEPHONE (OUTPATIENT)
Dept: PRIMARY CARE | Facility: CLINIC | Age: 64
End: 2024-11-26
Payer: COMMERCIAL

## 2024-11-26 NOTE — TELEPHONE ENCOUNTER
----- Message from Angelina Winifred sent at 11/26/2024 10:54 AM EST -----  Please let pt know that her vitamin D, sugar, electrolytes, thyroid, liver function are all normal.   Her white blood cell is slightly low and her kidney function was slightly elevated and we will continue to monitor.  Her cholesterol was again elevated.  notify them of all the lipid #s.  I recommend a healthy diet and exercise and see if she is open to a low dose cholesterol medication at this point.

## 2024-12-05 ENCOUNTER — APPOINTMENT (OUTPATIENT)
Dept: PRIMARY CARE | Facility: CLINIC | Age: 64
End: 2024-12-05
Payer: COMMERCIAL

## 2025-02-19 DIAGNOSIS — K21.9 GASTROESOPHAGEAL REFLUX DISEASE WITHOUT ESOPHAGITIS: ICD-10-CM

## 2025-02-19 RX ORDER — OMEPRAZOLE 40 MG/1
CAPSULE, DELAYED RELEASE ORAL
Qty: 90 CAPSULE | Refills: 1 | Status: SHIPPED | OUTPATIENT
Start: 2025-02-19

## 2025-03-18 ENCOUNTER — APPOINTMENT (OUTPATIENT)
Dept: DERMATOLOGY | Facility: CLINIC | Age: 65
End: 2025-03-18
Payer: COMMERCIAL

## 2025-03-18 DIAGNOSIS — D18.01 CHERRY ANGIOMA: ICD-10-CM

## 2025-03-18 DIAGNOSIS — Z12.83 SCREENING EXAM FOR SKIN CANCER: ICD-10-CM

## 2025-03-18 DIAGNOSIS — D22.9 NEVUS: Primary | ICD-10-CM

## 2025-03-18 DIAGNOSIS — L73.2 HIDRADENITIS SUPPURATIVA: ICD-10-CM

## 2025-03-18 DIAGNOSIS — L82.1 SEBORRHEIC KERATOSIS: ICD-10-CM

## 2025-03-18 DIAGNOSIS — L81.4 LENTIGO: ICD-10-CM

## 2025-03-18 PROCEDURE — 1159F MED LIST DOCD IN RCRD: CPT | Performed by: NURSE PRACTITIONER

## 2025-03-18 PROCEDURE — 99213 OFFICE O/P EST LOW 20 MIN: CPT | Performed by: NURSE PRACTITIONER

## 2025-03-18 PROCEDURE — 1036F TOBACCO NON-USER: CPT | Performed by: NURSE PRACTITIONER

## 2025-03-18 NOTE — PROGRESS NOTES
Subjective     Maryam Mccarthy is a 65 y.o. female who presents for the following: Skin Check.   Established patient in for full body skin exam.     Review of Systems:  No other skin or systemic complaints other than what is documented elsewhere in the note.    The following portions of the chart were reviewed this encounter and updated as appropriate:       Skin Cancer History  No skin cancer on file.    Specialty Problems    None    Past Medical History:  Maryam Mccarthy  has a past medical history of Asymptomatic menopausal state, Encounter for screening for osteoporosis, Fracture of coccyx, initial encounter for closed fracture (Multi), Lactose intolerance, unspecified (12/12/2014), Melena (07/06/2021), Pain in right knee (07/07/2021), Pain in right knee (11/29/2021), Personal history of malignant neoplasm of other parts of uterus (12/12/2014), Personal history of other diseases of the digestive system (06/14/2021), Personal history of other specified conditions (03/24/2022), Personal history of peptic ulcer disease (02/11/2015), and Umbilical hernia without obstruction or gangrene (11/03/2016).    Past Surgical History:  Maryam Mccarthy  has a past surgical history that includes Tubal ligation (12/14/2014); Total abdominal hysterectomy w/ bilateral salpingoophorectomy (12/14/2014); Other surgical history (11/02/2016); Umbilical hernia repair (11/02/2016); Other surgical history (11/25/2020); Other surgical history (02/14/2019); Other surgical history (02/14/2019); and Other surgical history (12/01/2022).    Family History:  Patient family history is not on file.    Social History:  Maryam Mccarthy  reports that she has never smoked. She has never used smokeless tobacco. She reports current alcohol use. She reports that she does not use drugs.    Allergies:  Codeine and Oxycodone-acetaminophen    Current Medications / CAM's:    Current Outpatient Medications:     cholecalciferol (Vitamin D-3) 25 MCG (1000 UT)  capsule, Take 1 capsule (25 mcg) by mouth once daily., Disp: , Rfl:     ibuprofen 600 mg tablet, Take by mouth., Disp: , Rfl:     omeprazole (PriLOSEC) 40 mg DR capsule, TAKE 1 CAPSULE BY MOUTH DAILY BEFORE A MEAL, Disp: 90 capsule, Rfl: 1     Objective   Well appearing patient in no apparent distress; mood and affect are within normal limits.      Assessment/Plan   1. Nevus  Uniform pigmented macule(s)/papule(s) with reassuring findings on dermoscopy    -Discussed nature of condition  -Reassurance, benign-appearing features on examination today  -Recommend continued observation    2. Lentigo  Tan macules    -Benign appearing on exam  -Reassurance, recommend observation    3. Cherry angioma  Cherry red papules    -Discussed nature of condition  -Reassurance, recommend continued observation    4. Seborrheic keratosis  Stuck on, waxy macule(s)/papule(s)/plaque(s) with comedo-like openings and milia like cysts    -Discussed nature of condition  -Reassurance, recommend continued observation    5. Screening exam for skin cancer    Follow Up In Dermatology

## 2025-03-31 ENCOUNTER — OFFICE VISIT (OUTPATIENT)
Dept: PRIMARY CARE | Facility: CLINIC | Age: 65
End: 2025-03-31
Payer: COMMERCIAL

## 2025-03-31 VITALS
HEIGHT: 69 IN | HEART RATE: 75 BPM | SYSTOLIC BLOOD PRESSURE: 122 MMHG | DIASTOLIC BLOOD PRESSURE: 86 MMHG | TEMPERATURE: 97.9 F | BODY MASS INDEX: 27.55 KG/M2 | WEIGHT: 186 LBS

## 2025-03-31 DIAGNOSIS — R39.15 URINARY URGENCY: Primary | ICD-10-CM

## 2025-03-31 LAB
POC APPEARANCE, URINE: CLEAR
POC BILIRUBIN, URINE: NEGATIVE
POC BLOOD, URINE: NEGATIVE
POC COLOR, URINE: YELLOW
POC GLUCOSE, URINE: NEGATIVE MG/DL
POC KETONES, URINE: NEGATIVE MG/DL
POC LEUKOCYTES, URINE: NEGATIVE
POC NITRITE,URINE: NEGATIVE
POC PH, URINE: 6.5 PH
POC PROTEIN, URINE: NEGATIVE MG/DL
POC SPECIFIC GRAVITY, URINE: 1.01
POC UROBILINOGEN, URINE: 0.2 EU/DL

## 2025-03-31 PROCEDURE — 1036F TOBACCO NON-USER: CPT | Performed by: FAMILY MEDICINE

## 2025-03-31 PROCEDURE — 1123F ACP DISCUSS/DSCN MKR DOCD: CPT | Performed by: FAMILY MEDICINE

## 2025-03-31 PROCEDURE — 81003 URINALYSIS AUTO W/O SCOPE: CPT | Performed by: FAMILY MEDICINE

## 2025-03-31 PROCEDURE — 1160F RVW MEDS BY RX/DR IN RCRD: CPT | Performed by: FAMILY MEDICINE

## 2025-03-31 PROCEDURE — 3008F BODY MASS INDEX DOCD: CPT | Performed by: FAMILY MEDICINE

## 2025-03-31 PROCEDURE — 1159F MED LIST DOCD IN RCRD: CPT | Performed by: FAMILY MEDICINE

## 2025-03-31 PROCEDURE — 99213 OFFICE O/P EST LOW 20 MIN: CPT | Performed by: FAMILY MEDICINE

## 2025-03-31 PROCEDURE — 1158F ADVNC CARE PLAN TLK DOCD: CPT | Performed by: FAMILY MEDICINE

## 2025-03-31 RX ORDER — NITROFURANTOIN 25; 75 MG/1; MG/1
100 CAPSULE ORAL 2 TIMES DAILY
Qty: 10 CAPSULE | Refills: 0 | Status: SHIPPED | OUTPATIENT
Start: 2025-03-31 | End: 2025-04-05

## 2025-03-31 NOTE — PROGRESS NOTES
"Subjective   Patient ID: Maryam Mccarthy is a 65 y.o. female who presents for UTI (possible UTI; 1 wk hx of lower abd pressure and urinary urgency).    HPI   65-year-old female presents complaining of 1 week history of lower abdominal/suprapubic pressure and urinary urgency and frequency.    No dysuria, gross hematuria, fevers, chills, flank pain.  No increase in nocturia.  Occasional left flank discomfort  No history of kidney stones.  No history of frequent UTIs  No bowel changes.  no vaginal changes or bleeding.    no discharge, itching    s/p complete hysterectomy/BSO; hx of uterine CA 2010; no chemo or radiation.      10/2024 colonoscopy  normal. f/u 5 yrs due to fam hx  GI vollweiler.      Review of Systems  ROS as stated in HPI    Objective   /86   Pulse 75   Temp 36.6 °C (97.9 °F)   Ht 1.753 m (5' 9\")   Wt 84.4 kg (186 lb)   BMI 27.47 kg/m²     Physical Exam  Constitutional: A&O; NAD  Heart: RRR     Lungs: CTA bilateral   Abd: Soft, Nontender, Nondistended; neg lloyds  Ext:  No edema;    Neuro: No gross neuro deficits     Psych: Judgment, orientation, mood, affect, insight wnl   Assessment/Plan   Problem List Items Addressed This Visit    None  Visit Diagnoses         Codes    Urinary urgency    -  Primary R39.15    Relevant Medications    nitrofurantoin, macrocrystal-monohydrate, (Macrobid) 100 mg capsule    Other Relevant Orders    POCT UA Automated manually resulted (Completed)    Urine Culture          check UA-neg; send culture  rx macrobid- if culture negative- will have her stop antibiotic and fu with urogyn       "

## 2025-04-02 ENCOUNTER — TELEPHONE (OUTPATIENT)
Dept: PRIMARY CARE | Facility: CLINIC | Age: 65
End: 2025-04-02
Payer: COMMERCIAL

## 2025-04-02 LAB — BACTERIA UR CULT: NORMAL

## 2025-04-02 NOTE — TELEPHONE ENCOUNTER
----- Message from Angelina Vitale sent at 4/2/2025  1:32 PM EDT -----  Please let pt know her urine culture was negative for any infection so I recommend she stop the antibiotic and follow-up with a urogynecologist for further evaluation.  give her #s

## 2025-06-22 NOTE — PROGRESS NOTES
"Subjective   Patient ID: Maryam Mccarthy is a 65 y.o. female who presents for Annual Exam (She is fasting for blood work today. ).    HPI   65 -year-old female presents for physical     sees gyn for exams/mammos;   s/p complete hysterectomy/BSO; hx of uterine CA 2010; no chemo or radiation.       BMI 26  has been watching diet and walking    hx mild HLD    5/2024 bone density test +osteopenia  takes Ca+vit D     10/2024 colonoscopy  normal. f/u 5 yrs  GI vollweiler    tetanus 2017   Zostavax 2015; shingrix- had  Flu shot-in fall   covid shots- had;       sees optho; hx of left eye vitreos detachment in past  hx Dry eyes  hx of recent right eye infection -seeing optho lauren couple days    She has seen her dentist for regular cleanings      She denies any chest pains, palpitations, edema, shortness of breath, abdominal pains, nausea, vomiting, diarrhea, constipation, blood in stool, melena.   hx occ reflux- omeprazole- only using prn     sees  derm for skin checks    hx of leukopenia- saw heme dr sierra 2018 - neg workup for WBC. was found to have +IVETH and RF; referred to rheum- saw dr almazan 2/2019- asymptompatic.       works for [employer]- planning to retire this summer.       Review of Systems  ROS as stated in HPI    Objective   /70   Pulse 82   Temp 36.6 °C (97.8 °F)   Ht 1.753 m (5' 9\")   Wt 79.7 kg (175 lb 12.8 oz)   BMI 25.96 kg/m²     Physical Exam  Constitutional: A&O; NAD  HEENT: conjunctiva normal. EOMI; PERRLA; lids normal; TM's clear;   Neck: supple; No lymphadenopathy   Heart: RRR     Lungs: CTA bilateral   Abd: Soft, Nontender, Nondistended  Ext:  No edema;    Neuro: No gross neuro deficits     Psych: Judgment, orientation, mood, affect, insight wnl   Assessment/Plan   Problem List Items Addressed This Visit           ICD-10-CM    HLD (hyperlipidemia) E78.5    Relevant Orders    CT cardiac scoring wo IV contrast     Other Visit Diagnoses         Codes      Healthcare maintenance    -  Primary " Z00.00    Relevant Orders    Comprehensive Metabolic Panel    Hemoglobin A1C    Lipid Panel    TSH with reflex to Free T4 if abnormal    Vitamin D 25-Hydroxy,Total (for eval of Vitamin D levels)    CBC and Auto Differential      Encounter for immunization     Z23    Relevant Orders    Pneumococcal conjugate vaccine, 20-valent (PREVNAR 20)          check labs  check CT calcium score- info given  sees gyn for exams/mammos.   10/2024 colonoscopy- f/u 5yrs   Tdap 2017  flu shot in fall  had shingrix   Prevnar 20 given today  5/2024 bone density test +osteopenia  Recommend Calcium + Vitamin D supplement.   healthy lifestyle-diet, exercise.   sees derm for skin checks  Fu 1 yr or sooner if needed

## 2025-06-23 ASSESSMENT — PROMIS GLOBAL HEALTH SCALE
RATE_QUALITY_OF_LIFE: VERY GOOD
RATE_PHYSICAL_HEALTH: GOOD
RATE_AVERAGE_PAIN: 2
CARRYOUT_SOCIAL_ACTIVITIES: GOOD
CARRYOUT_PHYSICAL_ACTIVITIES: MODERATELY
RATE_SOCIAL_SATISFACTION: GOOD
RATE_AVERAGE_FATIGUE: MILD
RATE_MENTAL_HEALTH: VERY GOOD
EMOTIONAL_PROBLEMS: SOMETIMES
RATE_GENERAL_HEALTH: GOOD

## 2025-06-24 ENCOUNTER — APPOINTMENT (OUTPATIENT)
Dept: PRIMARY CARE | Facility: CLINIC | Age: 65
End: 2025-06-24
Payer: COMMERCIAL

## 2025-06-24 VITALS
HEIGHT: 69 IN | TEMPERATURE: 97.8 F | BODY MASS INDEX: 26.04 KG/M2 | WEIGHT: 175.8 LBS | HEART RATE: 82 BPM | SYSTOLIC BLOOD PRESSURE: 104 MMHG | DIASTOLIC BLOOD PRESSURE: 70 MMHG

## 2025-06-24 DIAGNOSIS — E78.5 HYPERLIPIDEMIA, UNSPECIFIED HYPERLIPIDEMIA TYPE: ICD-10-CM

## 2025-06-24 DIAGNOSIS — Z00.00 HEALTHCARE MAINTENANCE: Primary | ICD-10-CM

## 2025-06-24 DIAGNOSIS — Z23 ENCOUNTER FOR IMMUNIZATION: ICD-10-CM

## 2025-06-24 PROCEDURE — 90471 IMMUNIZATION ADMIN: CPT | Performed by: FAMILY MEDICINE

## 2025-06-24 PROCEDURE — 3008F BODY MASS INDEX DOCD: CPT | Performed by: FAMILY MEDICINE

## 2025-06-24 PROCEDURE — 1159F MED LIST DOCD IN RCRD: CPT | Performed by: FAMILY MEDICINE

## 2025-06-24 PROCEDURE — 1036F TOBACCO NON-USER: CPT | Performed by: FAMILY MEDICINE

## 2025-06-24 PROCEDURE — 90677 PCV20 VACCINE IM: CPT | Performed by: FAMILY MEDICINE

## 2025-06-24 PROCEDURE — 99397 PER PM REEVAL EST PAT 65+ YR: CPT | Performed by: FAMILY MEDICINE

## 2025-06-24 PROCEDURE — 1160F RVW MEDS BY RX/DR IN RCRD: CPT | Performed by: FAMILY MEDICINE

## 2025-06-25 ENCOUNTER — TELEPHONE (OUTPATIENT)
Dept: PRIMARY CARE | Facility: CLINIC | Age: 65
End: 2025-06-25
Payer: COMMERCIAL

## 2025-06-25 LAB
25(OH)D3+25(OH)D2 SERPL-MCNC: 51 NG/ML (ref 30–100)
ALBUMIN SERPL-MCNC: 4.4 G/DL (ref 3.6–5.1)
ALP SERPL-CCNC: 55 U/L (ref 37–153)
ALT SERPL-CCNC: 11 U/L (ref 6–29)
ANION GAP SERPL CALCULATED.4IONS-SCNC: 4 MMOL/L (CALC) (ref 7–17)
AST SERPL-CCNC: 20 U/L (ref 10–35)
BASOPHILS # BLD AUTO: 42 CELLS/UL (ref 0–200)
BASOPHILS NFR BLD AUTO: 1.4 %
BILIRUB SERPL-MCNC: 1 MG/DL (ref 0.2–1.2)
BUN SERPL-MCNC: 11 MG/DL (ref 7–25)
CALCIUM SERPL-MCNC: 9.1 MG/DL (ref 8.6–10.4)
CHLORIDE SERPL-SCNC: 109 MMOL/L (ref 98–110)
CHOLEST SERPL-MCNC: 225 MG/DL
CHOLEST/HDLC SERPL: 4.5 (CALC)
CO2 SERPL-SCNC: 27 MMOL/L (ref 20–32)
CREAT SERPL-MCNC: 0.77 MG/DL (ref 0.5–1.05)
EGFRCR SERPLBLD CKD-EPI 2021: 86 ML/MIN/1.73M2
EOSINOPHIL # BLD AUTO: 174 CELLS/UL (ref 15–500)
EOSINOPHIL NFR BLD AUTO: 5.8 %
ERYTHROCYTE [DISTWIDTH] IN BLOOD BY AUTOMATED COUNT: 14 % (ref 11–15)
EST. AVERAGE GLUCOSE BLD GHB EST-MCNC: 105 MG/DL
EST. AVERAGE GLUCOSE BLD GHB EST-SCNC: 5.8 MMOL/L
GLUCOSE SERPL-MCNC: 92 MG/DL (ref 65–99)
HBA1C MFR BLD: 5.3 %
HCT VFR BLD AUTO: 39.7 % (ref 35–45)
HDLC SERPL-MCNC: 50 MG/DL
HGB BLD-MCNC: 12.7 G/DL (ref 11.7–15.5)
LDLC SERPL CALC-MCNC: 145 MG/DL (CALC)
LYMPHOCYTES # BLD AUTO: 915 CELLS/UL (ref 850–3900)
LYMPHOCYTES NFR BLD AUTO: 30.5 %
MCH RBC QN AUTO: 30.5 PG (ref 27–33)
MCHC RBC AUTO-ENTMCNC: 32 G/DL (ref 32–36)
MCV RBC AUTO: 95.2 FL (ref 80–100)
MONOCYTES # BLD AUTO: 336 CELLS/UL (ref 200–950)
MONOCYTES NFR BLD AUTO: 11.2 %
NEUTROPHILS # BLD AUTO: 1533 CELLS/UL (ref 1500–7800)
NEUTROPHILS NFR BLD AUTO: 51.1 %
NONHDLC SERPL-MCNC: 175 MG/DL (CALC)
PLATELET # BLD AUTO: 238 THOUSAND/UL (ref 140–400)
PMV BLD REES-ECKER: 8.7 FL (ref 7.5–12.5)
POTASSIUM SERPL-SCNC: 4 MMOL/L (ref 3.5–5.3)
PROT SERPL-MCNC: 6.7 G/DL (ref 6.1–8.1)
RBC # BLD AUTO: 4.17 MILLION/UL (ref 3.8–5.1)
SODIUM SERPL-SCNC: 140 MMOL/L (ref 135–146)
TRIGL SERPL-MCNC: 161 MG/DL
TSH SERPL-ACNC: 2.79 MIU/L (ref 0.4–4.5)
WBC # BLD AUTO: 3 THOUSAND/UL (ref 3.8–10.8)

## 2025-06-25 NOTE — TELEPHONE ENCOUNTER
----- Message from Angelina Vitale sent at 6/25/2025  9:06 AM EDT -----  Please let pt know that her vitamin D, sugar, electrolytes, thyroid, liver, and kidney function are all normal.   Her white blood cell count was again slightly low but stable at 3 (should be above 3.8).   Her cholesterol was elevated.  notify them of all the lipid #s.  I recommend a healthy diet and exercise and we will cont to monitor     ----- Message -----  From: Patti Guidry Results In  Sent: 6/25/2025   4:53 AM EDT  To: Angelina Vitale, DO

## 2025-07-25 ENCOUNTER — APPOINTMENT (OUTPATIENT)
Dept: RADIOLOGY | Facility: CLINIC | Age: 65
End: 2025-07-25
Payer: COMMERCIAL

## 2025-08-13 DIAGNOSIS — K21.9 GASTROESOPHAGEAL REFLUX DISEASE WITHOUT ESOPHAGITIS: ICD-10-CM

## 2025-08-13 RX ORDER — OMEPRAZOLE 40 MG/1
CAPSULE, DELAYED RELEASE ORAL
Qty: 90 CAPSULE | Refills: 1 | Status: SHIPPED | OUTPATIENT
Start: 2025-08-13

## 2025-08-26 ENCOUNTER — HOSPITAL ENCOUNTER (OUTPATIENT)
Dept: RADIOLOGY | Facility: CLINIC | Age: 65
Discharge: HOME | End: 2025-08-26
Payer: COMMERCIAL

## 2025-08-26 DIAGNOSIS — E78.5 HYPERLIPIDEMIA, UNSPECIFIED HYPERLIPIDEMIA TYPE: ICD-10-CM

## 2025-08-26 PROCEDURE — 75571 CT HRT W/O DYE W/CA TEST: CPT

## 2025-08-27 ENCOUNTER — RESULTS FOLLOW-UP (OUTPATIENT)
Dept: PRIMARY CARE | Facility: CLINIC | Age: 65
End: 2025-08-27
Payer: COMMERCIAL

## 2025-12-01 ENCOUNTER — APPOINTMENT (OUTPATIENT)
Dept: PRIMARY CARE | Facility: CLINIC | Age: 65
End: 2025-12-01
Payer: COMMERCIAL

## 2026-03-23 ENCOUNTER — APPOINTMENT (OUTPATIENT)
Dept: DERMATOLOGY | Facility: CLINIC | Age: 66
End: 2026-03-23
Payer: COMMERCIAL

## 2026-06-25 ENCOUNTER — APPOINTMENT (OUTPATIENT)
Dept: PRIMARY CARE | Facility: CLINIC | Age: 66
End: 2026-06-25
Payer: COMMERCIAL